# Patient Record
Sex: FEMALE | Race: WHITE | NOT HISPANIC OR LATINO | Employment: UNEMPLOYED | URBAN - METROPOLITAN AREA
[De-identification: names, ages, dates, MRNs, and addresses within clinical notes are randomized per-mention and may not be internally consistent; named-entity substitution may affect disease eponyms.]

---

## 2022-08-17 ENCOUNTER — OFFICE VISIT (OUTPATIENT)
Dept: NEUROLOGY | Facility: MEDICAL CENTER | Age: 47
End: 2022-08-17
Attending: PSYCHIATRY & NEUROLOGY
Payer: COMMERCIAL

## 2022-08-17 VITALS — WEIGHT: 261.47 LBS | BODY MASS INDEX: 41.04 KG/M2 | HEIGHT: 67 IN | RESPIRATION RATE: 18 BRPM

## 2022-08-17 DIAGNOSIS — G35 MULTIPLE SCLEROSIS (HCC): Primary | ICD-10-CM

## 2022-08-17 PROCEDURE — 99202 OFFICE O/P NEW SF 15 MIN: CPT | Performed by: PSYCHIATRY & NEUROLOGY

## 2022-08-17 PROCEDURE — 99203 OFFICE O/P NEW LOW 30 MIN: CPT | Performed by: PSYCHIATRY & NEUROLOGY

## 2022-08-17 ASSESSMENT — PATIENT HEALTH QUESTIONNAIRE - PHQ9: CLINICAL INTERPRETATION OF PHQ2 SCORE: 0

## 2022-08-17 NOTE — PROGRESS NOTES
"[facility] NEUROLOGY  MULTIPLE SCLEROSIS & NEUROIMMUNOLOGY  NEW PATIENT VISIT    Referral source: Finn Brian MD    DISEASE SUMMARY:  Principal neurologic diagnosis: MS  Diagnosis of MS: 10/2015  Disease History:  - 10/2015: onset of pain in the left neck, workup included CT c-spine, later numbness and weakness involving the LUE, numbness in the LLE, ED workup included MRI c-spine, diagnosed w/ MS; treated w/ IVMP w/ complete recovery  - 2019: stopped Betaseron due to injection fatigue  Disease course at onset: relapsing  - 11/2015: started Betaseron  Current disease course: progressive ?activity  Previous disease therapies:  - Betaseron  Current disease therapies:  - none  Symptomatic therapies:  - none  CSF:  - never sampled  Other Testing:  - none  MRI head:  - 11/11/2016: stable, no abnormal enhancement  - 10/20/2015: cord lesion w/ enhancement  MRI cervical spine:  - 10/20/2015: 1.7 cm enhancing lesion at C1-C2  MRI thoracic spine:  -     CC: MS    HISTORY OF ILLNESS:  Marti Hightower is a 46 y.o. woman with a history most notable for MS and HLD.  Today, she was unaccompanied, and she provided the following history:    I have summarized Marti's history in \"disease summary\" above.    A review of MS-related symptoms was notable for the following:    Fatigue: yes; attributed to age  Weakness: no  Numbness: no  Incoordination: no  Spasms/Spasticity: no  Vision Impairment: no  Walking/Balance Problems: no  Neuralgia: no  Bowel Symptoms: no  Bladder Symptoms: no  Heat Sensitivity: yes; she gets \"super tired\"  Depression: no  Cognitive/Memory Problems: no  Sexual Dysfunction:  not assessed  Anxiety: no    MEDICAL AND SURGICAL HISTORY:  Past Medical History:   Diagnosis Date    Diverticulitis 07/01/2015    Multiple sclerosis (HCC)      Past Surgical History:   Procedure Laterality Date    PAWAN BY LAPAROSCOPY  8/12/2015    Procedure: PAWAN BY LAPAROSCOPY;  Surgeon: Finn Morrell M.D.;  Location: SURGERY " [facility];  Service:     HYSTERECTOMY ROBOTIC  4/12/2010    Performed by NADIR LEZAMA at SURGERY [facility] ORS    SALPINGECTOMY  4/12/2010    Performed by NADIR LEZAMA at SURGERY [facility]    OOPHORECTOMY  4/12/2010    Performed by NADIR LEZAMA at SURGERY [facility]    ORIF, WRIST  2007    right    LA BREAST AUGMENTATION WITH IMPLANT  2006    ABDOMINOPLASTY  2006     MEDICATIONS:  No current outpatient medications on file.     SOCIAL HISTORY:  Social History     Tobacco Use    Smoking status: Never    Smokeless tobacco: Never   Substance Use Topics    Alcohol use: Yes     Alcohol/week: 0.5 oz     Types: 1 drink(s) per week     Comment: 1-2 per month     Social History     Social History Narrative    Not on file     FAMILY HISTORY:  Family History   Problem Relation Age of Onset    No Known Problems Mother      REVIEW OF SYSTEMS:  A ROS was completed.  Pertinent positives and negatives were included in the HPI, above.  All other systems were reviewed and are negative.    PHYSICAL EXAM:  General/Medical:  - NAD  - hair, skin, nails, and joints were normal    Neuro:  MENTAL STATUS: awake and alert; no deficits of speech or language; oriented to conversation; affect was appropriate to situation; pleasant, cooperative    CRANIAL NERVES:    II: acuity: J1-1/J1-1, fields: intact to confrontation, pupils: 3/3 to 2/2 without a relative afferent pupillary defect, discs: sharp    III/IV/VI: versions: intact without nystagmus    V: facial sensation: symmetric to light touch    VII: facial expression: symmetric    VIII: hearing: intact to finger rub    IX/X: palate: elevates symmetrically    XI: shoulder shrug: symmetric    XII: tongue: midline    MOTOR:  - bulk: normal throughout  - tone: normal in the upper extremities  Upper Extremity Strength  (R/L)    5/5   Elbow flexion 5/5   Elbow extension 5/5   Shoulder abduction 5/5     Lower Extremity Strength  (R/L)   Hip flexion 5/5   Knee  "extension 5/5   Knee flexion 5/5   Ankle plantarflexion NT   Ankle dorsiflexion NT     - can walk on toes and heels  - pronator drift: absent  - abnormal movements: none    SENSATION:  - light touch: grossly intact over the upper and lower extremities  - vibration (R/L, seconds): 17/19 at the great toes  - pinprick: NT  - proprioception: NT  - Romberg: absent    COORDINATION:  - finger to nose: normal, no ataxia on exam  - finger tapping: rapid and accurate, bilaterally    REFLEXES:  Reflex Right Left   BR 2+ 2+   Biceps 2+ 2+   Triceps 2+ 2+   Patellae 2+ 2+   Achilles NT NT   Toes down down     GAIT:  - narrow base and normal  - heel-raised/toe-raised gait: intact/intact  - tandem gait: intact    QUANTITATIVE SCORES:  Timed 25-foot walk (sec): 4.2 on 8/17/2022.  Assistive device: none    REVIEW OF IMAGING STUDIES:  I have summarized pertinent data in \"disease summary\" above.    REVIEW OF LABORATORY STUDIES:  No recent data available.    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 woman with MS and HLD.  Plan for repeat MRI of the brain and cervical spine as well as baseline imaging of the thoracic spine.  We will follow up via Zoom in 1-2 months when the results are available for review.    PLAN:  History of MS:  - repeat MRI brain and cervical spine w/o and w/ contrast  - MRI thoracic spine w/o and w/ contrast    Follow-Up:  - Return in about 3 months (around 11/17/2022) for Long.    Signed: Byron Melchor M.D.  "

## 2022-10-25 ENCOUNTER — APPOINTMENT (OUTPATIENT)
Dept: RADIOLOGY | Facility: MEDICAL CENTER | Age: 47
End: 2022-10-25
Attending: PSYCHIATRY & NEUROLOGY
Payer: COMMERCIAL

## 2022-10-25 DIAGNOSIS — G35 MULTIPLE SCLEROSIS (HCC): ICD-10-CM

## 2022-10-25 PROCEDURE — 700117 HCHG RX CONTRAST REV CODE 255: Performed by: PSYCHIATRY & NEUROLOGY

## 2022-10-25 PROCEDURE — 70553 MRI BRAIN STEM W/O & W/DYE: CPT

## 2022-10-25 PROCEDURE — 72156 MRI NECK SPINE W/O & W/DYE: CPT

## 2022-10-25 PROCEDURE — 72157 MRI CHEST SPINE W/O & W/DYE: CPT

## 2022-10-25 PROCEDURE — A9576 INJ PROHANCE MULTIPACK: HCPCS | Performed by: PSYCHIATRY & NEUROLOGY

## 2022-10-25 RX ADMIN — GADOTERIDOL 20 ML: 279.3 INJECTION, SOLUTION INTRAVENOUS at 13:54

## 2022-11-02 ENCOUNTER — APPOINTMENT (OUTPATIENT)
Dept: NEUROLOGY | Facility: MEDICAL CENTER | Age: 47
End: 2022-11-02
Attending: REGISTERED NURSE
Payer: COMMERCIAL

## 2022-11-03 ENCOUNTER — TELEPHONE (OUTPATIENT)
Dept: NEUROLOGY | Facility: MEDICAL CENTER | Age: 47
End: 2022-11-03

## 2022-11-03 ENCOUNTER — TELEMEDICINE (OUTPATIENT)
Dept: NEUROLOGY | Facility: MEDICAL CENTER | Age: 47
End: 2022-11-03
Attending: PSYCHIATRY & NEUROLOGY
Payer: COMMERCIAL

## 2022-11-03 VITALS — BODY MASS INDEX: 34.46 KG/M2 | WEIGHT: 220 LBS

## 2022-11-03 DIAGNOSIS — G35 MULTIPLE SCLEROSIS (HCC): Primary | ICD-10-CM

## 2022-11-03 PROCEDURE — 99215 OFFICE O/P EST HI 40 MIN: CPT | Mod: 95 | Performed by: PSYCHIATRY & NEUROLOGY

## 2022-11-03 PROCEDURE — 99417 PROLNG OP E/M EACH 15 MIN: CPT | Mod: 95 | Performed by: PSYCHIATRY & NEUROLOGY

## 2022-11-03 NOTE — PROGRESS NOTES
"[facility] NEUROLOGY  MULTIPLE SCLEROSIS & NEUROIMMUNOLOGY  FOLLOW-UP VISIT    This evaluation was conducted via Zoom using secure and encrypted videoconferencing technology. The patient was in their home in the [facility].    The patient's identity was confirmed and verbal consent was obtained for this virtual visit.    DISEASE SUMMARY:  Principal neurologic diagnosis: MS  Diagnosis of MS: 10/2015  Disease History:  - 10/2015: onset of pain in the left neck, workup included CT c-spine, later numbness and weakness involving the LUE, numbness in the LLE, ED workup included MRI c-spine, diagnosed w/ MS; treated w/ IVMP w/ complete recovery  - 11/2015: started Betaseron  - 2019: stopped Betaseron due to injection fatigue  Disease course at onset: relapsing  - 11/2015: started Betaseron  Current disease course: progressive ?activity  Previous disease therapies:  - Betaseron: injection fatigue  Current disease therapies:  - none  Symptomatic therapies:  - none  CSF:  - never sampled  Other Testing:  - none  MRI head:  - 10/25/2022: \"stable... no new lesions... no abnormal enhancement\" (per my review, several T2-FLAIR hyper-intense lesions, mostly non-specific in location, perhaps 1 truly juxta-cortical and 1 truly cheryl-ventricular lesion)  - 11/11/2016: stable, no abnormal enhancement  - 10/20/2015: cord lesion w/ enhancement  MRI cervical spine:  - 10/25/2022: \"no new lesions... no abnormal enhancement\" (no comparison performed) (per my review, one peripheral, typical-appearing lesion present)  - 10/20/2015: 1.7 cm enhancing lesion at C1-C2  MRI thoracic spine:  - 10/25/2022: no visible lesions  Immunizations:  - Pneumonia?:  - SARS-CoV-2?:   Cancer Screens:  - mammogram:   - PAP?:   - skin check:     CC: history of MS    INTERVAL HISTORY:  Marti Hightower is a 46 y.o. woman with a history of MS and HLD.  I last saw her in the [facility] on 8/17/2022.  At that time I recommended repeat imaging.  Today, I followed " up with her via Zoom, and she provided the following interval history:    Marti has not noticed any new or worsened symptoms since the last visit.    She recently underwent gastric bypass surgery, and she is recovering well.    MEDICATIONS:  Current Outpatient Medications   Medication Sig    Multiple Vitamin (MULTIVITAMIN ADULT PO) Take 1 Capsule by mouth every day.     MEDICAL, SOCIAL, AND FAMILY HISTORY:  There is no change in the patient's ROS or medical, social, or family histories since the previous visit on 8/17/2022.    REVIEW OF SYSTEMS:  A ROS was completed.  Pertinent positives and negatives were included in the HPI, above.  All other systems were reviewed and are negative.    PHYSICAL EXAM:  General/Medical:  - NAD    Neuro:  MENTAL STATUS: awake and alert; no deficits of speech or language; oriented to conversation; affect was appropriate to situation; pleasant, cooperative    CRANIAL NERVES:    II: acuity: NT, fields: NT, pupils: NT, discs: NT    III/IV/VI: versions: NT    V: facial sensation: NT    VII: facial expression: NT    VIII: hearing: intact to voice    IX/X: palate: NT    XI: shoulder shrug: NT    XII: tongue: NT    MOTOR:  - bulk: NT  - tone: NT  Upper Extremity Strength  (R/L)    NT   Elbow flexion NT   Elbow extension NT   Shoulder abduction NT     Lower Extremity Strength  (R/L)   Hip flexion NT   Knee extension NT   Knee flexion NT   Ankle plantarflexion NT   Ankle dorsiflexion NT     - pronator drift: NT  - abnormal movements: NT    SENSATION:  - light touch: NT  - vibration (R/L, seconds): NT at the great toes  - pinprick: NT  - proprioception: NT  - Romberg: NT    COORDINATION:  - finger to nose: NT  - finger tapping: NT    REFLEXES:  Reflex Right Left   BR NT NT   Biceps NT NT   Triceps NT NT   Patellae NT NT   Achilles NT NT   Toes NT NT     GAIT:  - NT    REVIEW OF IMAGING STUDIES:  I have summarized interval data above.    REVIEW OF LABORATORY STUDIES:  No additional data  "since the last visit.    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  We reviewed her imaging studies in detail.  MRI of the brain shows several T2-FLAIR hyper-intense lesions, though the majority of these are non-specific in location.  A few (perhaps by jordon given the number of lesions) could be considered cheryl-ventricular and juxta-cortical.  Interestingly, MRI brain has been stable off immunotherapy.  Since Marti has had a single clinical attack, she would have to meet criteria for both DIS and TID in order to be diagnosed with MS.  We discussed the option of LP, and she agreed.    PLAN:  CIS:  - LP  Orders Placed This Encounter    DX-LUMBAR PUNCTURE FOR DIAGNOSIS    CSF Cell Count (tube 1)    CSF Cell Count (tube 4)    glucose, CSF    protein, CSF    CBC with differential    oligoclonal band profile    aPTT    PT/INR    Multiple Vitamin (MULTIVITAMIN ADULT PO)     Follow-Up:  - Return in about 6 weeks (around 12/15/2022).    Signed: Byron Melchor M.D.    BILLING DOCUMENTATION:   I spent 60 minutes reviewing the medical record, interviewing and examining the patient, discussing my impression (see \"assessment\" above), and coordinating care.  "